# Patient Record
Sex: MALE | Race: OTHER | NOT HISPANIC OR LATINO | URBAN - METROPOLITAN AREA
[De-identification: names, ages, dates, MRNs, and addresses within clinical notes are randomized per-mention and may not be internally consistent; named-entity substitution may affect disease eponyms.]

---

## 2018-04-23 ENCOUNTER — EMERGENCY (EMERGENCY)
Facility: HOSPITAL | Age: 23
LOS: 1 days | Discharge: HOME | End: 2018-04-23
Admitting: EMERGENCY MEDICINE
Payer: SELF-PAY

## 2018-04-23 VITALS
SYSTOLIC BLOOD PRESSURE: 141 MMHG | DIASTOLIC BLOOD PRESSURE: 97 MMHG | OXYGEN SATURATION: 98 % | TEMPERATURE: 98 F | RESPIRATION RATE: 18 BRPM | HEART RATE: 61 BPM

## 2018-04-23 PROCEDURE — 99053 MED SERV 10PM-8AM 24 HR FAC: CPT

## 2018-04-23 PROCEDURE — 99282 EMERGENCY DEPT VISIT SF MDM: CPT | Mod: 25

## 2018-04-23 NOTE — ED ADULT TRIAGE NOTE - CHIEF COMPLAINT QUOTE
Pt c/o pain to back left side of neck x3 years. Began to radiate around left side of neck to throat on Saturday when he traveled here from [place].  Denies SOB, CP. Denies any PMH.

## 2018-04-24 RX ORDER — METHOCARBAMOL 500 MG/1
2 TABLET, FILM COATED ORAL
Qty: 30 | Refills: 0 | OUTPATIENT
Start: 2018-04-24 | End: 2018-04-28

## 2018-04-24 RX ORDER — IBUPROFEN 200 MG
600 TABLET ORAL ONCE
Qty: 0 | Refills: 0 | Status: COMPLETED | OUTPATIENT
Start: 2018-04-24 | End: 2018-04-24

## 2018-04-24 RX ADMIN — Medication 600 MILLIGRAM(S): at 00:20

## 2018-04-24 NOTE — ED PROVIDER NOTE - OBJECTIVE STATEMENT
22 y/o male without a significant PMHx presents with left sided neck pain x3 years. States that pain has worsened in severity x2 days since flight from [place]. Further describes the pain as 5/10, worse wwhen moving his neck, dull, and intermittent. Takes pain medication from [place], unable to recall name. Denies any trauma, visual changes, hearing changes, taste changes, headache, fevers, chills, chest pain, sob, abdominal pain, urinary symptoms, numbness/weakness/tingling

## 2018-04-24 NOTE — ED PROVIDER NOTE - CARE PLAN
Principal Discharge DX:	Neck pain  Assessment and plan of treatment:	pls rest, drink plenty of fluids, motrin for pain, take your muscle relaxer as prescribed, do  not take before driving as it will make you drowsy, return for any worsening symptoms or any other concerning symptoms

## 2018-04-24 NOTE — ED PROVIDER NOTE - PLAN OF CARE
pls rest, drink plenty of fluids, motrin for pain, take your muscle relaxer as prescribed, do  not take before driving as it will make you drowsy, return for any worsening symptoms or any other concerning symptoms
